# Patient Record
Sex: FEMALE | Race: WHITE | NOT HISPANIC OR LATINO | Employment: OTHER | URBAN - METROPOLITAN AREA
[De-identification: names, ages, dates, MRNs, and addresses within clinical notes are randomized per-mention and may not be internally consistent; named-entity substitution may affect disease eponyms.]

---

## 2017-04-18 PROBLEM — I71.40 ABDOMINAL AORTIC ANEURYSM (AAA) WITHOUT RUPTURE (HCC): Status: ACTIVE | Noted: 2017-04-18

## 2017-04-18 PROBLEM — Q61.02 MULTIPLE RENAL CYSTS: Status: ACTIVE | Noted: 2017-04-18

## 2017-05-18 PROBLEM — Q61.02 MULTIPLE RENAL CYSTS: Chronic | Status: ACTIVE | Noted: 2017-04-18

## 2017-05-18 PROBLEM — M51.36 DEGENERATIVE DISC DISEASE, LUMBAR: Status: ACTIVE | Noted: 2017-05-18

## 2017-05-18 PROBLEM — M51.36 DEGENERATIVE DISC DISEASE, LUMBAR: Chronic | Status: ACTIVE | Noted: 2017-05-18

## 2017-05-18 PROBLEM — I71.40 ABDOMINAL AORTIC ANEURYSM (AAA) WITHOUT RUPTURE (HCC): Chronic | Status: ACTIVE | Noted: 2017-04-18

## 2017-12-19 ENCOUNTER — TELEMEDICINE2 (OUTPATIENT)
Dept: ENDOCRINOLOGY | Facility: MEDICAL CENTER | Age: 82
End: 2017-12-19
Payer: MEDICARE

## 2017-12-19 VITALS
WEIGHT: 102.4 LBS | TEMPERATURE: 97.8 F | BODY MASS INDEX: 20.1 KG/M2 | HEART RATE: 73 BPM | SYSTOLIC BLOOD PRESSURE: 123 MMHG | HEIGHT: 60 IN | OXYGEN SATURATION: 93 % | DIASTOLIC BLOOD PRESSURE: 76 MMHG | RESPIRATION RATE: 18 BRPM

## 2017-12-19 DIAGNOSIS — I10 ESSENTIAL HYPERTENSION: ICD-10-CM

## 2017-12-19 DIAGNOSIS — E21.3 HYPERPARATHYROIDISM (HCC): ICD-10-CM

## 2017-12-19 PROCEDURE — 99204 OFFICE O/P NEW MOD 45 MIN: CPT | Mod: GT | Performed by: INTERNAL MEDICINE

## 2017-12-19 NOTE — PROGRESS NOTES
Endocrinology Clinic Progress Note (Telemedicine visit)  Primary care physician: TOMAS Batres    CC: Routine follow-up for    HPI:  Charlene Callejas is a 81 y.o. old patient with history of     ROS:  Constitutional: No unintentional weight loss  Cardiac: No palpitations or racing heart  Resp: No shortness of breath    Past Medical History:  Patient Active Problem List    Diagnosis Date Noted   • Dyslipidemia, goal LDL below 100 11/02/2012     Priority: Medium   • Degenerative disc disease, lumbar 05/18/2017   • Multiple renal cysts 04/18/2017   • Abdominal aortic aneurysm (AAA) without rupture (CMS-[facility]) 04/18/2017   • Sciatica of right side 09/21/2016   • Hypertension 11/02/2012   • Osteoporosis 11/02/2012       Past Surgical History:  Past Surgical History:   Procedure Laterality Date   • ANAL FISTULECTOMY  1960   • HERNIA REPAIR  1974 approx    umbilicical       Allergies:  Patient has no known allergies.    Social History:  Social History     Social History   • Marital status:      Spouse name: N/A   • Number of children: N/A   • Years of education: N/A     Occupational History   • Not on file.     Social History Main Topics   • Smoking status: Current Some Day Smoker     Packs/day: 0.50     Years: 50.00     Types: Cigarettes   • Smokeless tobacco: Never Used   • Alcohol use 1.2 oz/week     2 Glasses of wine per week   • Drug use: No   • Sexual activity: Not Currently     Birth control/ protection: Post-Menopausal      Comment:  '06     Other Topics Concern   • Not on file     Social History Narrative   • No narrative on file       Family History:  Family History   Problem Relation Age of Onset   • Other Mother      lupus   • Cancer Father      stomach cancer   • Cancer Brother      colon,        Medications:    Current Outpatient Prescriptions:   •  hydrochlorothiazide (HYDRODIURIL) 12.5 MG tablet, Take 1 Tab by mouth every day., Disp: 90 Tab, Rfl: 0  •  gabapentin (NEURONTIN) 300 MG Cap, Take 1  Cap by mouth 2 times a day as needed (max #2 daily)., Disp: 180 Cap, Rfl: 3  •  KRILL OIL PO, Take 1 Capsule by mouth every day., Disp: , Rfl:   •  trazodone (DESYREL) 150 MG Tab, Take 1 Tab by mouth at bedtime as needed for Sleep., Disp: 90 Tab, Rfl: 3  •  Cholecalciferol (VITAMIN D) 1000 UNIT CAPS, Take  by mouth 3 times a day.  , Disp: , Rfl:   •  vitamin e (VITAMIN E) 400 UNIT CAPS, Take 400 Units by mouth every day.  , Disp: , Rfl:   •  Cyanocobalamin (VITAMIN B-12 PO), Take  by mouth every day.  , Disp: , Rfl:     Labs:    Physical Examination:  Vital signs: /76   Pulse 73   Temp 36.6 °C (97.8 °F)   Resp 18   Ht 1.524 m (5')   Wt 46.4 kg (102 lb 6.4 oz)   LMP 11/02/1988   SpO2 93%   BMI 20.00 kg/m²   General: No apparent distress, cooperative  Eyes: No obvious exophthalmos  ENMT: Normal on external inspection of nose, lips  Neck: On visual inspection no obvious mass seen  Resp: Normal effort, clear to auscultation bilaterally (this was done by trained tele-presenter at the originating site)  CVS: Regular rate and rhythm (this was done by trained tele-presenter at the originating site)  Extremities: No edema  Neuro: Alert and oriented  Skin: No rash on visible skin  Psych: Normal mood and affect    Assessment and Plan:    Charlene was seen today for establish care.    Diagnoses and all orders for this visit:    Hyperparathyroidism (CMS-[facility])  -     BASIC METABOLIC PANEL; Future  -     ALBUMIN; Future  -     IONIZED CALCIUM; Future  -     PHOSPHORUS; Future  -     PTH WITH CALCIUM; Future  -     URINE CALCIUM 24 HR; Future  -     URINE CREATININE 24 HR; Future  -     VITAMIN D,25 HYDROXY; Future    Essential hypertension        Return in about 4 weeks (around 1/16/2018).    Thank you for allowing me to participate in the care of this patient.    This visit was conducted utilizing secure and encrypted videoconferencing equipment with the assistance of a trained tele-presenter at the originating  site.    Abril Mcgregor M.D.  12/19/17    CC:   CLARA Batres.    This note was created using voice recognition software (Dragon). The accuracy of the dictation is limited by the abilities of the software. I have reviewed the note prior to signing, however some errors in grammar and context are still possible. If you have any questions related to this note please do not hesitate to contact our office.

## 2018-01-02 ENCOUNTER — TELEMEDICINE2 (OUTPATIENT)
Dept: ENDOCRINOLOGY | Facility: MEDICAL CENTER | Age: 83
End: 2018-01-02
Payer: MEDICARE

## 2018-01-02 VITALS
WEIGHT: 101.6 LBS | DIASTOLIC BLOOD PRESSURE: 86 MMHG | BODY MASS INDEX: 19.18 KG/M2 | OXYGEN SATURATION: 93 % | SYSTOLIC BLOOD PRESSURE: 139 MMHG | HEIGHT: 61 IN | HEART RATE: 80 BPM

## 2018-01-02 DIAGNOSIS — E21.3 HYPERPARATHYROIDISM (HCC): ICD-10-CM

## 2018-01-02 PROCEDURE — 99214 OFFICE O/P EST MOD 30 MIN: CPT | Performed by: INTERNAL MEDICINE

## 2018-01-02 NOTE — PROGRESS NOTES
New Patient Consult Note  PCP: TOMAS Batres    Reason for consult: Hyperparathyroidism    HPI:  Charlene Callejas is a 82 y.o. old patient who presents for tele-consultation today for hyperparathyroidism. Recent labs showed elevated parathyroid hormone level. No prior history of hyperparathyroidism. No associated hypercalcemia. She has remote history of kidney stones, one episode over 12 years ago. She does have osteoporosis, reports that last bone density scan was done about 4-5 years ago. She declined osteoporosis medications at bedtime due to concerns for potential side effects. No recent fractures.    ROS:  Constitutional: No unintentional weight loss  Cardiac: No palpitations or racing heart  All other systems were reviewed and were negative.    Past Medical History:  Patient Active Problem List    Diagnosis Date Noted   • Dyslipidemia, goal LDL below 100 11/02/2012     Priority: Medium   • Degenerative disc disease, lumbar 05/18/2017   • Multiple renal cysts 04/18/2017   • Abdominal aortic aneurysm (AAA) without rupture (CMS-[facility]) 04/18/2017   • Sciatica of right side 09/21/2016   • Hypertension 11/02/2012   • Osteoporosis 11/02/2012       Past Surgical History:  Past Surgical History:   Procedure Laterality Date   • ANAL FISTULECTOMY  1960   • HERNIA REPAIR  1974 approx    umbilicical       Allergies:  Patient has no known allergies.    Social History:  Social History     Social History   • Marital status:      Spouse name: N/A   • Number of children: N/A   • Years of education: N/A     Occupational History   • Not on file.     Social History Main Topics   • Smoking status: Current Some Day Smoker     Packs/day: 0.50     Years: 50.00     Types: Cigarettes   • Smokeless tobacco: Never Used   • Alcohol use 1.2 oz/week     2 Glasses of wine per week   • Drug use: No   • Sexual activity: Not Currently     Birth control/ protection: Post-Menopausal      Comment:  '06     Other Topics Concern   • Not  "on file     Social History Narrative   • No narrative on file       Family History:  Family History   Problem Relation Age of Onset   • Other Mother      lupus   • Cancer Father      stomach cancer   • Cancer Brother      colon,        Medications:    Current Outpatient Prescriptions:   •  hydrochlorothiazide (HYDRODIURIL) 12.5 MG tablet, Take 1 Tab by mouth every day., Disp: 90 Tab, Rfl: 0  •  gabapentin (NEURONTIN) 300 MG Cap, Take 1 Cap by mouth 2 times a day as needed (max #2 daily)., Disp: 180 Cap, Rfl: 3  •  KRILL OIL PO, Take 1 Capsule by mouth every day., Disp: , Rfl:   •  trazodone (DESYREL) 150 MG Tab, Take 1 Tab by mouth at bedtime as needed for Sleep., Disp: 90 Tab, Rfl: 3  •  Cholecalciferol (VITAMIN D) 1000 UNIT CAPS, Take  by mouth 3 times a day.  , Disp: , Rfl:   •  vitamin e (VITAMIN E) 400 UNIT CAPS, Take 400 Units by mouth every day.  , Disp: , Rfl:   •  Cyanocobalamin (VITAMIN B-12 PO), Take  by mouth every day.  , Disp: , Rfl:     Labs:  Results for JB LEBRON (MRN [id number]) as of 1/2/2018 09:23   Ref. Range 9/27/2017 11:10   Pth, Intact Latest Ref Range: 15 - 65 pg/mL 71 (H)   Results for JB LEBRON (MRN [id number]) as of 1/2/2018 09:23   Ref. Range 9/27/2017 11:10   Creatinine Latest Ref Range: 0.57 - 1.00 mg/dL 0.66   GFR If  Latest Ref Range: >59 mL/min/1.73 96   GFR If Non  Latest Ref Range: >59 mL/min/1.73 84   Bun-Creatinine Ratio Latest Ref Range: 12 - 28  23   Calcium Latest Ref Range: 8.7 - 10.3 mg/dL 10.1   AST(SGOT) Latest Ref Range: 0 - 40 IU/L 17   ALT(SGPT) Latest Ref Range: 0 - 32 IU/L 11   Alkaline Phosphatase Latest Ref Range: 39 - 117 IU/L 97   Total Bilirubin Latest Ref Range: 0.0 - 1.2 mg/dL 0.8   Albumin Latest Ref Range: 3.5 - 4.7 g/dL 4.1     Physical Examination:  Vital signs: /86   Pulse 80   Ht 1.549 m (5' 1\")   Wt 46.1 kg (101 lb 9.6 oz)   LMP 11/02/1988   SpO2 93%   BMI 19.20 kg/m²   General: No apparent distress, " cooperative  Eyes: No obvious exophthalmos  ENMT: Normal on external inspection of nose, lips  Neck: On visual inspection no obvious mass seen  Resp: Normal effort, clear to auscultation bilaterally (this was done by trained tele-presenter at the originating site)  CVS: Regular rate and rhythm (this was done by trained tele-presenter at the originating site)  Extremities: No edema (this was done by trained tele-presenter at the originating site)  Neuro: Alert and oriented  Skin: No rash on visible skin  Psych: Normal mood and affect    Assessment and Plan:    Hyperparathyroidism (CMS-HCC)  · We discussed about differential diagnosis, especially regarding secondary hyperparathyroidism due to vitamin D deficiency versus primary hyperparathyroidism  · We will obtain following lab work up to determine the etiology of hyperparathyroidism  · For now no changes to medications  -     BASIC METABOLIC PANEL; Future  -     PTH WITH CALCIUM; Future  -     ALBUMIN; Future  -     IONIZED CALCIUM; Future  -     PHOSPHORUS; Future  -     VITAMIN D,25 HYDROXY; Future    Return in about 4 weeks (around 1/30/2018).    Thank you for allowing me to participate in the care of this patient.    This consultation was conducted utilizing secure and encrypted videoconferencing equipment with the assistance of a trained tele-presenter at the originating site.    Abril Mcgregor M.D.  01/02/18    CC:   TOMAS Batres    This note was created using voice recognition software (Dragon). The accuracy of the dictation is limited by the abilities of the software. I have reviewed the note prior to signing, however some errors in grammar and context are still possible. If you have any questions related to this note please do not hesitate to contact our office.

## 2018-06-07 PROBLEM — E21.0 PRIMARY HYPERPARATHYROIDISM (HCC): Status: ACTIVE | Noted: 2018-06-07

## 2019-01-22 ENCOUNTER — TELEPHONE (OUTPATIENT)
Dept: ENDOCRINOLOGY | Facility: MEDICAL CENTER | Age: 84
End: 2019-01-22

## 2019-01-22 NOTE — TELEPHONE ENCOUNTER
Phone Number Called: [phone] (home)       Message: spoke to patient about stopping the vitamin D. Said she would stop taking it tomorrow.    Left Message for patient to call back: no

## 2019-01-22 NOTE — TELEPHONE ENCOUNTER
Phone Number Called: [phone] (home)       Message: This patient lives out of town and is taking care of her daughter who recently had surgery to remove cancer. Scheduled her with Telemedicine in [place] with [facility] but the apt is not until April 4th. Are these abnormal results something that needs to come in sooner?    Left Message for patient to call back: no

## 2019-01-22 NOTE — TELEPHONE ENCOUNTER
----- Message from Jeovany Lee P.A.-C. sent at 1/22/2019  7:28 AM PST -----  Please let know the recent test results are abnormal and have them make an appointment.

## 2020-01-30 PROBLEM — G47.00 INSOMNIA: Status: ACTIVE | Noted: 2020-01-30

## 2021-01-13 DIAGNOSIS — Z23 NEED FOR VACCINATION: ICD-10-CM

## 2021-07-29 PROBLEM — K80.20 MULTIPLE GALLSTONES: Status: ACTIVE | Noted: 2021-07-29

## 2023-03-25 PROBLEM — J96.21 ACUTE ON CHRONIC RESPIRATORY FAILURE WITH HYPOXIA (HCC): Status: ACTIVE | Noted: 2023-03-25

## 2023-03-25 PROBLEM — U07.1 PNEUMONIA DUE TO COVID-19 VIRUS: Status: ACTIVE | Noted: 2023-03-25

## 2023-03-25 PROBLEM — J12.82 PNEUMONIA DUE TO COVID-19 VIRUS: Status: ACTIVE | Noted: 2023-03-25

## 2023-06-09 PROBLEM — Z91.81 RISK FOR FALLS: Status: ACTIVE | Noted: 2023-06-09

## 2023-06-30 PROBLEM — J44.9 CHRONIC OBSTRUCTIVE PULMONARY DISEASE (HCC): Status: ACTIVE | Noted: 2023-06-30

## 2024-02-15 PROBLEM — U07.1 PNEUMONIA DUE TO COVID-19 VIRUS: Status: RESOLVED | Noted: 2023-03-25 | Resolved: 2024-02-15

## 2024-02-15 PROBLEM — J12.82 PNEUMONIA DUE TO COVID-19 VIRUS: Status: RESOLVED | Noted: 2023-03-25 | Resolved: 2024-02-15
